# Patient Record
Sex: MALE | Race: WHITE
[De-identification: names, ages, dates, MRNs, and addresses within clinical notes are randomized per-mention and may not be internally consistent; named-entity substitution may affect disease eponyms.]

---

## 2023-04-08 ENCOUNTER — HOSPITAL ENCOUNTER (EMERGENCY)
Dept: HOSPITAL 33 - ED | Age: 48
Discharge: LEFT BEFORE DISCHARGE | End: 2023-04-08
Payer: COMMERCIAL

## 2023-04-08 DIAGNOSIS — Z53.21: Primary | ICD-10-CM

## 2023-04-09 ENCOUNTER — HOSPITAL ENCOUNTER (EMERGENCY)
Dept: HOSPITAL 33 - ED | Age: 48
Discharge: HOME | End: 2023-04-09
Payer: COMMERCIAL

## 2023-04-09 VITALS — OXYGEN SATURATION: 99 % | HEART RATE: 81 BPM | SYSTOLIC BLOOD PRESSURE: 132 MMHG | DIASTOLIC BLOOD PRESSURE: 77 MMHG
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
## 2023-04-09 DIAGNOSIS — Z79.891: ICD-10-CM

## 2023-04-09 DIAGNOSIS — Z28.310: ICD-10-CM

## 2023-04-09 DIAGNOSIS — S42.022A: Primary | ICD-10-CM

## 2023-04-09 DIAGNOSIS — I10: ICD-10-CM

## 2023-04-09 PROCEDURE — 99283 EMERGENCY DEPT VISIT LOW MDM: CPT

## 2023-04-09 PROCEDURE — 73000 X-RAY EXAM OF COLLAR BONE: CPT

## 2023-04-09 PROCEDURE — 73030 X-RAY EXAM OF SHOULDER: CPT

## 2023-04-09 NOTE — XRAY
Indication: Pain following fall.



Comparison: None



3 view left shoulder demonstrates comminuted moderately angulated distal

clavicle fracture.  No other bony, articular, or soft tissue abnormalities.

## 2023-04-09 NOTE — ERPHSYRPT
- History of Present Illness


Time Seen by Provider: 04/09/23 10:10


Source: patient, family


Exam Limitations: no limitations


Patient Subjective Stated Complaint: Left shoulder injury


Triage Nursing Assessment: Patient ambulated back to ED and transferred self to 

bed. Patient A+O X3. Patient's skin pink, warm and dry. Patient complains of 

left shoulder pain 1/10 when not moving. Patient states he was drunk last night 

and fell down landing left shoulder on concrete. Patient states pain is 10/10 

when moving.


Occurred: yesterday


Method of Injury: fell


Quality: constant


Severity of Pain-Max: moderate


Severity of Pain-Current: moderate


Extremities Pain Location: shoulder: left (collar bone)


Modifying Factors: Improves With: immobilization, rest.  Worsens With: movement


Associated Symptoms: none, No chest discomfort, No neck pain


Allergies/Adverse Reactions: 








cefaclor [From Ceclor] Allergy (Verified 04/09/23 09:55)


   





Hx Influenza Vaccination/Date Given: No


Hx Pneumococcal Vaccination/Date Given: No


Immunizations Up to Date: Yes





Travel Risk





- International Travel


Have you traveled outside of the country in past 3 weeks: No





- Coronavirus Screening


Are you exhibiting any of the following symptoms?: No


Close contact with a COVID-19 positive Pt in past 14-21 Days: No





- Vaccine Status


Have you recieved a Covid-19 vaccination: No





- Review of Systems


Constitutional: No Symptoms


Eyes: No Symptoms


Ears, Nose, & Throat: No Symptoms


Respiratory: No Symptoms


Cardiac: No Symptoms


Abdominal/Gastrointestinal: No Symptoms


Genitourinary Symptoms: No Symptoms


Musculoskeletal: No Symptoms, Other (left clavicle only)


Skin: No Symptoms


Neurological: No Symptoms


Psychological: No Symptoms


Endocrine: No Symptoms


Hematologic/Lymphatic: No Symptoms


Immunological/Allergic: No Symptoms


All Other Systems: Reviewed and Negative





- Past Medical History


Pertinent Past Medical History: No


Neurological History: No Pertinent History, TIA


ENT History: No Pertinent History


Cardiac History: Hypertension


Respiratory History: No Pertinent History


Endocrine Medical History: No Pertinent History


Musculoskeletal History: No Pertinent History


GI Medical History: No Pertinent History


 History: No Pertinent History


Psycho-Social History: No Pertinent History


Male Reproductive Disorders: No Pertinent History





- Past Surgical History


Past Surgical History: Yes


Cardiac: No Pertinent History


Respiratory: No Pertinent History


Gastrointestinal: No Pertinent History


Genitourinary: No Pertinent History


Musculoskeletal: No Pertinent History


Male Surgical History: No Pertinent History


Other Surgical History: mehrdad eye surgery 2010





- Social History


Smoking Status: Never smoker


Exposure to second hand smoke: Yes


Drug Use: none


Patient Lives Alone: No


Significant Family History: no pertinent family hx





- Nursing Vital Signs


Nursing Vital Signs: 


                               Initial Vital Signs











Temperature  99.0 F   04/09/23 09:57


 


Pulse Rate  86   04/09/23 09:57


 


Respiratory Rate  20   04/09/23 09:57


 


Blood Pressure  123/80   04/09/23 09:57


 


O2 Sat by Pulse Oximetry  100   04/09/23 09:57








                                   Pain Scale











Pain Intensity                 1

















- Physical Exam


General Appearance: mild distress


Eyes, Ears, Nose, Throat Exam: normal ENT inspection


Neck Exam: normal inspection, non-tender, supple, full range of motion


Cardiovascular/Respiratory Exam: chest non-tender, normal breath sounds, regular

rate/rhythm


Abdominal Exam: non-tender, soft


Back Exam: normal inspection, normal range of motion


Shoulder Exam: bone tenderness (left clavicle), deformity (left clavicle), 

limited ROM, soft tissue tenderness, swelling


Elbow/Forearm Exam: normal inspection


Wrist Exam: normal inspection


Hand Exam: normal inspection


Neuro/Tendon Exam: normal sensation, normal motor functions


Mental Status Exam: alert, oriented x 3, cooperative


Skin Exam: normal color, warm


**SpO2 Interpretation**: normal


SpO2: 100


O2 Delivery: Room Air





Procedures





- Splinting


Time of Procedure: 11:00


Location of Splint: Left


Type of Splint: Other


Splint Applied By: ED Nurse


Pre-Proc Neuro Vasc Exam: normal


Post-Proc Neuro Vasc Exam: neurovascular intact





- Course


Nursing assessment & vital signs reviewed: Yes





- Radiology Exams


  ** Left Clavicle


X-ray Interpretation: Interpreted by me, Displaced Fracture


Ordered Tests: 


                               Active Orders 24 hr











 Category Date Time Status


 


 CLAVICLE Stat Exams  04/09/23 10:11 Taken


 


 SHOULDER Stat Exams  04/09/23 10:12 Taken








Medication Summary











Generic Name Dose Route Start Last Admin





  Trade Name Freq  PRN Reason Stop Dose Admin


 


Hydrocodone Bitart/Acetaminophen  2 tab  04/09/23 11:15 





  Hydrocodone/Apap 5/325 1 Tab Tablet  PO  04/09/23 11:16 





  SENT HOME W/ PATIENT ONE  














- Progress


Progress: improved


Progress Note: 


Left clavicle fx, closed, displaced, placed clavicle strap/sling, Rx norco, see 

ortho, off work.


04/09/23 11:05





Counseled pt/family regarding: diagnosis, need for follow-up, rad results (See 

orthopedic clinic.)





Medical Desision Making





- Independent Historian


Additional History obtained from: Spouse





- Diagnostic Testing


Radiological Interpretation: Interpreted by me





- Risk of complications


Minimal Risk: Minimal risk of morbidity





- Departure


Departure Disposition: Home


Clinical Impression: 


Closed left clavicular fracture


Qualifiers:


 Encounter type: initial encounter Clavicle location: shaft Fracture alignment: 

displaced Qualified Code(s): S42.022A - Displaced fracture of shaft of left 

clavicle, initial encounter for closed fracture





Condition: Stable


Critical Care Time: No


Referrals: 


DOCTOR,NO FAMILY [Primary Care Provider] - Follow up/PCP as directed


Instructions:  Shoulder Fracture (DC)


Additional Instructions: 


Broken collar bone, need to wear splint/sling, ibuprofen OK, norco if needed, 

see orthopedic clinic, off work pending seeing orthopedic specialist.


Forms:  Ortho Referral, Work/School Release Form

## 2023-04-09 NOTE — XRAY
Indication: Pain following fall.



Comparison: None



2 view left clavicle demonstrates comminuted moderately angulated distal

clavicle fracture.  No other bony, articular, or soft tissue abnormalities.

## 2023-07-27 ENCOUNTER — HOSPITAL ENCOUNTER (EMERGENCY)
Dept: HOSPITAL 33 - ED | Age: 48
Discharge: HOME | End: 2023-07-27
Payer: COMMERCIAL

## 2023-07-27 VITALS — RESPIRATION RATE: 18 BRPM | TEMPERATURE: 98.5 F

## 2023-07-27 VITALS — DIASTOLIC BLOOD PRESSURE: 82 MMHG | HEART RATE: 67 BPM | OXYGEN SATURATION: 97 % | SYSTOLIC BLOOD PRESSURE: 145 MMHG
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
## 2023-07-27 DIAGNOSIS — I10: ICD-10-CM

## 2023-07-27 DIAGNOSIS — S01.511A: Primary | ICD-10-CM

## 2023-07-27 DIAGNOSIS — Z28.310: ICD-10-CM
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
## 2023-07-27 DIAGNOSIS — Z23: ICD-10-CM

## 2023-07-27 PROCEDURE — 90715 TDAP VACCINE 7 YRS/> IM: CPT

## 2023-07-27 PROCEDURE — 12011 RPR F/E/E/N/L/M 2.5 CM/<: CPT

## 2023-07-27 PROCEDURE — 99282 EMERGENCY DEPT VISIT SF MDM: CPT

## 2023-07-27 PROCEDURE — 90471 IMMUNIZATION ADMIN: CPT

## 2023-07-27 NOTE — ERPHSYRPT
- History of Present Illness


Time Seen by Provider: 07/27/23 16:57


Source: patient


Exam Limitations: no limitations


Patient Subjective Stated Complaint: pt states he was at work when a cable broke

and hit him in the face


Triage Nursing Assessment: pt ambulated into the er; pt is axo x4; c/o 

laceration to rt lower lip; minimal bleeding present to rt lower inner lip; 

laceration measures 2 cm; 0.5 cm laceration present to outer rt lower lip; skin 

PDW; no respiratory distress present; hypertensive


Physician History: 





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 

on the face with a laceration right lower lip below the vermilion border.  There

was bleeding initially but is stopped prior to arrival.  Mild to moderate pain. 

No tooth ache or broken tooth.


Timing/Duration: today


Quality: painful


Severity: mild


Location: face


Possible Causes: other


Associated Symptoms: denies symptoms


Allergies/Adverse Reactions: 








cefaclor [From Ceclor] Allergy (Verified 07/27/23 17:04)


   Hives





Home Medications: 








No Reportable Medications [No Reported Medications]  07/27/23 [History]





Hx Tetanus, Diphtheria Vaccination/Date Given: No


Hx Influenza Vaccination/Date Given: No


Hx Pneumococcal Vaccination/Date Given: No





Travel Risk





- International Travel


Have you traveled outside of the country in past 3 weeks: No





- Coronavirus Screening


Are you exhibiting any of the following symptoms?: No


Close contact with a COVID-19 positive Pt in past 14-21 Days: No





- Vaccine Status


Have you recieved a Covid-19 vaccination: No





- Review of Systems


Constitutional: No Symptoms


Eyes: No Symptoms


Ears, Nose, & Throat: Mouth Pain, Mouth Swelling


Respiratory: No Symptoms


Cardiac: No Symptoms


Abdominal/Gastrointestinal: No Symptoms


Musculoskeletal: Injury


Skin: Skin Lesions


Psychological: No Symptoms





- Past Medical History


Pertinent Past Medical History: Yes


Neurological History: No Pertinent History, TIA


ENT History: No Pertinent History


Cardiac History: Hypertension


Respiratory History: No Pertinent History


Endocrine Medical History: No Pertinent History


Musculoskeletal History: No Pertinent History


GI Medical History: No Pertinent History


 History: No Pertinent History


Psycho-Social History: No Pertinent History


Male Reproductive Disorders: No Pertinent History





- Past Surgical History


Past Surgical History: Yes


Cardiac: No Pertinent History


Respiratory: No Pertinent History


Gastrointestinal: No Pertinent History


Genitourinary: No Pertinent History


Musculoskeletal: Orthopedic Surgery


Male Surgical History: No Pertinent History


Other Surgical History: mehrdad eye surgery 2010, left collar bone surgery





- Social History


Smoking Status: Never smoker


Exposure to second hand smoke: No


Drug Use: none


Patient Lives Alone: No


Significant Family History: no pertinent family hx





- Nursing Vital Signs


Nursing Vital Signs: 





                               Initial Vital Signs











Temperature  98.5 F   07/27/23 17:04


 


Pulse Rate  62   07/27/23 17:04


 


Respiratory Rate  18   07/27/23 17:04


 


Blood Pressure  146/79   07/27/23 17:04


 


O2 Sat by Pulse Oximetry  98   07/27/23 17:04








                                   Pain Scale











Pain Intensity                 2

















- Physical Exam


General Appearance: no apparent distress, alert


Eye Exam: PERRL/EOMI


Ears, Nose, Throat Exam: TMs normal, pharynx normal, moist mucous membranes, 

other (0.25 cm external laceration below the vermilion border on the right lower

 lip, through and through with 1 cm laceration on the inner side.  No active 

bleeding or spurting.)


Neck Exam: normal inspection, non-tender, supple, full range of motion


Respiratory Exam: normal breath sounds, accessory muscle use


Cardiovascular Exam: regular rate/rhythm, normal heart sounds


Back Exam: normal inspection


Extremity Exam: normal inspection


Neurologic Exam: alert, oriented x 3, CNs II-XII nml as tested


Skin Exam: normal color


**SpO2 Interpretation**: normal


SpO2: 98


O2 Delivery: Room Air





Procedures





- Laceration/Wound Repair


  ** Right Lip


Time of Procedure: 18:00


Wound Location: Right


Wound Length (cm): 0.25


Wound's Depth, Shape: into muscle, irregular


Wound Explored: clean


Irrigated: Yes


Hibiclens Prep: Yes


Anesthesia: 1% Lidocaine


Volume Anesthetic (ccs): 2


Wound Repaired With: sutures


Suture Size/Type: 5-0


Number of Sutures: 1


Splint Applied?: No


Sling Applied?: No


Ordered Tests: 





Medication Summary














Discontinued Medications














Generic Name Dose Route Start Last Admin





  Trade Name Freq  PRN Reason Stop Dose Admin


 


Diphtheria/Tetanus/Acell Pertussis  0.5 ml  07/27/23 17:36  07/27/23 17:45





  Tdap --Diph,Pertuss(Acell),Tet Vac/Pf 0.5 Ml Vial  IM  07/27/23 17:37  0.5 ml





  .ONCE ONE   Administration


 


Diphtheria/Tetanus/Acell Pertussis  Confirm  07/27/23 17:44 





  Tdap --Diph,Pertuss(Acell),Tet Vac/Pf 0.5 Ml Vial  Administered  07/27/23 

17:45 





  Dose  





  0.5 ml  





  IM  





  .STK-MED ONE  














- Progress


Progress: improved


Progress Note: 





07/27/23 18:08


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 

was working on it.  It on the face with a laceration.  Bleeding stopped prior to

 arrival.


On exam has external 0.25 cm laceration and internal 1 cm.  No active bleeding 

or spurting.  Laceration is repaired externally.  Do not think needs to repair 

internally.  Tetanus is updated.  Recommended Tylenol/ibuprofen and outpatient 

follow-up.


Counseled pt/family regarding: diagnosis, need for follow-up





- Departure


Departure Disposition: Home


Clinical Impression: 


 Lip laceration





Condition: Stable


Critical Care Time: No


Referrals: 


DOCTOR,NO FAMILY [Primary Care Provider] - Follow up/PCP as directed (Follow-up 

with your primary care physician for reevaluation in 2 to 3 days.  Suture 

removal in 5 days.)


Instructions:  Laceration Repair With Stitches (DC)


Additional Instructions: 


Intermittent ice application.  Tylenol/ibuprofen as needed.  Follow-up with 

primary care for reevaluation.  Suture removal in 5 days.  Return to ER for 

increasing pain swelling redness discharge/fever chills etc.